# Patient Record
Sex: MALE
[De-identification: names, ages, dates, MRNs, and addresses within clinical notes are randomized per-mention and may not be internally consistent; named-entity substitution may affect disease eponyms.]

---

## 2022-06-28 ENCOUNTER — APPOINTMENT (OUTPATIENT)
Dept: ORTHOPEDIC SURGERY | Facility: CLINIC | Age: 57
End: 2022-06-28
Payer: COMMERCIAL

## 2022-06-28 VITALS — WEIGHT: 194 LBS | HEIGHT: 71 IN | BODY MASS INDEX: 27.16 KG/M2

## 2022-06-28 PROBLEM — Z00.00 ENCOUNTER FOR PREVENTIVE HEALTH EXAMINATION: Status: ACTIVE | Noted: 2022-06-28

## 2022-06-28 PROCEDURE — 99213 OFFICE O/P EST LOW 20 MIN: CPT

## 2022-06-28 NOTE — IMAGING
[de-identified] :  no head position some spasm limited motion worse with the tension in flexion reflexes brisk and symmetric\par  slight antalgic gait to the right restricted motion in both hips in rotation some groin pain right more than left good strength no atrophy\par  lumbar tight dorsal lumbar fascia and hamstrings negative straight leg bilaterally pain radiates down right leg on occasion

## 2022-06-28 NOTE — ASSESSMENT
[FreeTextEntry1] :  continue work on weight maintenance ibuprofen did not need refill no x-rays indicated his hips he does not feel there bad enough to warrant replacement no indication for hip injection he is totally disabled unable to work I will see him back in 6 months

## 2022-06-28 NOTE — HISTORY OF PRESENT ILLNESS
[de-identified] : Patient continues to struggle as he said everything hurts with pain in the neck and back off Percocet \par  just taking ibuprofen 800 1-2 a day his weight is down from 224 to  207 we did track down the MRI of the\par cervical spine. He is reporting more pain in his hips right more than left we do have x-rays August 15, 2017 showing\par arthritis right more than

## 2022-06-28 NOTE — REASON FOR VISIT
[FreeTextEntry2] : Cervical disc disease, Lumbar disc disease, Bilateral hip joint arthritis \par Pain is manageable on the ibuprofen takes it once or twice a day some soreness hips right 1 left he is trying to the walking weight is stable to 0 7 does spend a little bit of his time in [place]

## 2023-01-05 ENCOUNTER — APPOINTMENT (OUTPATIENT)
Dept: ORTHOPEDIC SURGERY | Facility: CLINIC | Age: 58
End: 2023-01-05

## 2023-02-10 ENCOUNTER — APPOINTMENT (OUTPATIENT)
Dept: ORTHOPEDIC SURGERY | Facility: CLINIC | Age: 58
End: 2023-02-10
Payer: COMMERCIAL

## 2023-02-10 PROCEDURE — 99213 OFFICE O/P EST LOW 20 MIN: CPT

## 2023-02-10 NOTE — HISTORY OF PRESENT ILLNESS
[de-identified] : Patient continues to struggle as he said everything hurts with pain in the neck and back off Percocet \par  just taking ibuprofen 800 1-2 a day his weight is down from 224 to  207 we did track down the MRI of the\par cervical spine. He is reporting more pain in his hips right more than left we do have x-rays August 15, 2017 showing\par arthritis right more than

## 2023-02-10 NOTE — REASON FOR VISIT
[FreeTextEntry2] : back, neck bilateral hip and right knee pain  still takes ibuprofen daily weight is about the same

## 2023-02-10 NOTE — ASSESSMENT
[FreeTextEntry1] :  continue to work on weight maintenance ibuprofen did not need refill  no x-rays indicated his hips he does not feel there bad enough to warrant replacement no indication for hip injection he is totally disabled unable to work I will see him back in 6 months

## 2023-02-10 NOTE — IMAGING
[de-identified] :  no head position some spasm limited motion worse with the tension in flexion reflexes brisk and symmetric\par  slight antalgic gait to the right restricted motion in both hips in rotation some groin pain right more than left good strength no atrophy\par  lumbar tight dorsal lumbar fascia and hamstrings negative straight leg bilaterally pain radiates down right leg on occasion

## 2023-03-13 ENCOUNTER — APPOINTMENT (OUTPATIENT)
Dept: ORTHOPEDIC SURGERY | Facility: CLINIC | Age: 58
End: 2023-03-13

## 2023-08-10 ENCOUNTER — APPOINTMENT (OUTPATIENT)
Dept: ORTHOPEDIC SURGERY | Facility: CLINIC | Age: 58
End: 2023-08-10
Payer: COMMERCIAL

## 2023-08-10 DIAGNOSIS — M16.0 BILATERAL PRIMARY OSTEOARTHRITIS OF HIP: ICD-10-CM

## 2023-08-10 DIAGNOSIS — M51.9 UNSPECIFIED THORACIC, THORACOLUMBAR AND LUMBOSACRAL INTERVERTEBRAL DISC DISORDER: ICD-10-CM

## 2023-08-10 DIAGNOSIS — M50.90 CERVICAL DISC DISORDER, UNSPECIFIED, UNSPECIFIED CERVICAL REGION: ICD-10-CM

## 2023-08-10 PROCEDURE — 99213 OFFICE O/P EST LOW 20 MIN: CPT

## 2023-08-10 NOTE — REASON FOR VISIT
[FreeTextEntry2] : follow up for back bilateral hip and both knees hips bother him a little bit more sleeping his endurance is down a few blocks pain in both feet on the outer side weight is up 11 pounds still taking ibuprofen 800 once a day

## 2023-08-10 NOTE — IMAGING
[de-identified] :  no head position some spasm limited motion worse with the tension in flexion reflexes brisk and symmetric\par   slight antalgic gait to the right restricted motion in both hips in rotation some groin pain right more than left good strength no atrophy\par   lumbar tight dorsal lumbar fascia and hamstrings negative straight leg bilaterally pain radiates down right leg on occasion

## 2023-08-10 NOTE — HISTORY OF PRESENT ILLNESS
[de-identified] : Patient continues to struggle as he said everything hurts with pain in the neck and back off Percocet \par   just taking ibuprofen 800 1-2 a day his weight is down from 224 to  207 we did track down the MRI of the\par  cervical spine. He is reporting more pain in his hips right more than left we do have x-rays August 15, 2017 showing\par  arthritis right more than

## 2023-08-10 NOTE — ASSESSMENT
[FreeTextEntry1] :  continue to work on weight maintenance ibuprofen did not need refill  no x-rays indicated his hips he does not feel there bad enough to warrant replacement no indication for hip injection he is totally disabled unable to work I will see him back in one year

## 2024-08-22 ENCOUNTER — APPOINTMENT (OUTPATIENT)
Dept: ORTHOPEDIC SURGERY | Facility: CLINIC | Age: 59
End: 2024-08-22
Payer: MEDICARE

## 2024-08-22 DIAGNOSIS — M50.90 CERVICAL DISC DISORDER, UNSPECIFIED, UNSPECIFIED CERVICAL REGION: ICD-10-CM

## 2024-08-22 DIAGNOSIS — M51.9 UNSPECIFIED THORACIC, THORACOLUMBAR AND LUMBOSACRAL INTERVERTEBRAL DISC DISORDER: ICD-10-CM

## 2024-08-22 DIAGNOSIS — M16.0 BILATERAL PRIMARY OSTEOARTHRITIS OF HIP: ICD-10-CM

## 2024-08-22 PROCEDURE — 99213 OFFICE O/P EST LOW 20 MIN: CPT

## 2024-08-22 NOTE — IMAGING
[de-identified] :  no head position some spasm limited motion worse with the tension in flexion reflexes brisk and symmetric\par   slight antalgic gait to the right restricted motion in both hips in rotation some groin pain right more than left good strength no atrophy\par   lumbar tight dorsal lumbar fascia and hamstrings negative straight leg bilaterally pain radiates down right leg on occasion

## 2024-08-22 NOTE — ASSESSMENT
[FreeTextEntry1] :  continue to work on weight maintenance ibuprofen did not need refill  today no x-rays indicated his hips he does not feel there bad enough to warrant replacement  no indication for hip injection(s)  he is totally disabled unable to work I will see him back in one year

## 2024-08-22 NOTE — HISTORY OF PRESENT ILLNESS
[de-identified] : Patient continues to struggle as he said everything hurts with pain in the neck and back off Percocet \par   just taking ibuprofen 800 1-2 a day his weight is down from 224 to  207 we did track down the MRI of the\par  cervical spine. He is reporting more pain in his hips right more than left we do have x-rays August 15, 2017 showing\par  arthritis right more than

## 2024-08-22 NOTE — IMAGING
[de-identified] :  no head position some spasm limited motion worse with the tension in flexion reflexes brisk and symmetric\par   slight antalgic gait to the right restricted motion in both hips in rotation some groin pain right more than left good strength no atrophy\par   lumbar tight dorsal lumbar fascia and hamstrings negative straight leg bilaterally pain radiates down right leg on occasion

## 2024-08-22 NOTE — REASON FOR VISIT
[FreeTextEntry2] : follow up for back bilateral hip and both knees hips still bothers him at night sleeping will wake him up burning into both groin right more than left weight is stable when he would pivot he would have some increased pain weight is about the same using the ibuprofen regularly which helps

## 2024-08-22 NOTE — HISTORY OF PRESENT ILLNESS
[de-identified] : Patient continues to struggle as he said everything hurts with pain in the neck and back off Percocet \par   just taking ibuprofen 800 1-2 a day his weight is down from 224 to  207 we did track down the MRI of the\par  cervical spine. He is reporting more pain in his hips right more than left we do have x-rays August 15, 2017 showing\par  arthritis right more than

## 2025-09-05 ENCOUNTER — APPOINTMENT (OUTPATIENT)
Dept: ORTHOPEDIC SURGERY | Facility: CLINIC | Age: 60
End: 2025-09-05
Payer: MEDICARE

## 2025-09-05 DIAGNOSIS — M16.0 BILATERAL PRIMARY OSTEOARTHRITIS OF HIP: ICD-10-CM

## 2025-09-05 PROCEDURE — 99213 OFFICE O/P EST LOW 20 MIN: CPT
